# Patient Record
(demographics unavailable — no encounter records)

---

## 2025-03-03 NOTE — PHYSICAL EXAM
[de-identified] :                    Well appearing and nourished with no obvious deformities or distress.  Eyes:  No conjunctival injection and no xanthelasmas. HEENT:  Normocephalic.Normal oral mucosa. No pallor or cyanosis Neck:  No jugular venous distension. with normal A and V wave forms. No palpable adenopathy. Cardiovascular:  Normal rate and rhythm with normal S1, S2 and a grade 1/6 systolic murmur. Distal arterial pulses are normal. No significant peripheral edema. Pulmonary:  Lungs are clear to auscultation and percussion. Normal respiratory pattern without any accessory muscle use Abdomen:  Soft, non-tender ; no palpable organomegaly or masses. Extremities: No digital clubbing, cyanosis or ischemic changes. Skin:  No skin lesions, rashes, ulcers or xanthomas. Psychiatric:  Alert and oriented to person, place and time. Appropriate mood and affect.

## 2025-03-03 NOTE — HISTORY OF PRESENT ILLNESS
[FreeTextEntry1] : Patient's activity level is very limited by bad knees back and left shoulder.  He anticipates that there will be need for bilateral knee and left shoulder replacement.  Because of the orthopedic limitations, very difficult to comment on his overall functional capacity which is very limited.  Smokes occasional cigars. Social EtOH No regular exercise  History of a heart murmur. Uncertain about his blood pressure control.  No lightheadedness syncope No palpitations PND orthopnea No edema or claudication.

## 2025-03-03 NOTE — REASON FOR VISIT
[FreeTextEntry1] : 71-year-old male presents here for cardiac evaluation with concerns about cardiac risk factors and an ECG abnormality.  1.  Hypertension greater than 20 years 2.  Diabetes greater than 20 years 3.  Hyperlipidemia 4.  Greater than 30 pack years of cigarette smoking (stopped 16 years ago) Family history appears uncertain.

## 2025-03-03 NOTE — ASSESSMENT
[FreeTextEntry1] : ECG: Normal sinus rhythm at 62 with voltage criteria for LVH left anterior fascicular block and nonspecific T wave flattening.  Impression: 71-year-old hypertensive hyperlipidemic diabetic former smoker presenting for evaluation. Functional capacity very difficult to assess being very limited by a variety of orthopedic issues. Blood pressure today is on the high side Abnormal ECG as outlined above.  Plan: 1.  Serial blood pressure monitoring to assess the true level. 2.  Obtain and review copy of most recent blood work. 3.  Echocardiography to assess for hypertensive heart disease and the ECG abnormalities 4.  Pharmacologic nuclear stress test the patient cannot walk a treadmill. 5 we will regroup and discuss the results of the testing and data upon completion.

## 2025-04-28 NOTE — PHYSICAL EXAM
[de-identified] : Patient is awake and alert.  Well appearing in no acute distress Patient is interactive and appropriate RUE 5/5 Deltoid/Biceps/Triceps/WE/WF//IO LUE 5/5 Deltoid/Biceps/Triceps/WE/WF//IO RLE 5/5 HF/KE/KF/DF/PF/EHL LLE 5/5 HF/KE/KF/DF/PF/EHL Sensation intact to light touch No tenderness of the cervical, thoracic or lumbar midline or paraspinal region  Negative Willson's bilaterally Negative clonus bilaterally Palpable DP pulses bilaterally  Narrow-based gait, although outward bowing at bilateral knees, no assistive devices. reflexes 2+ [de-identified] : MRI Lumbar Spine Non Con 1/13/2025 ([facility]): IMPRESSION: 1. Moderate to advanced multilevel degenerative change. 2. Grade 1 anterolisthesis L4-L5. 3. Moderate to severe spinal canal stenosis L2-L3. Moderate spinal canal stenosis L1-L2. Mild to moderate spinal canal stenosis L3-L4 and L4-L5 4. Significant multilevel neural foraminal and subarticular recess stenosis outlined above.    XRay Lumbar Spine Flex/Ex 1/13/2025 ([facility]): IMPRESSION: Grade 1 degenerative anterolisthesis of L4 on L5. Progressive multilevel degenerative changes compared with prior study dated 01/19/2017

## 2025-04-28 NOTE — HISTORY OF PRESENT ILLNESS
[de-identified] : Mr. Juan A Hancock is a very pleasant 70 y/o male with a PMHx of DM, HTN, HLD, GERD, anxiety, hx of scoliosis s/p cervical to thoracic fusion in 2012 c/b post-operative infection (performed by an outside provider), chronic lower back pain x 10+ years, presents today for a neurosurgical consultation as a referral from pain management, Dr. Ng for ongoing back pain and neurogenic claudication. Mr. Hancock underwent a cervical to thoracic fusion in 2012 due to back pain from scoliosis, his postoperative course was complicated by Staphylococcus infection. His pain overall improved post-operatively, however he had begun to experience lower back pain a couple of years after the spinal fusion surgery. He reports bilateral lower back pain/stiffness that is worse first thing in the morning and improves throughout the day. Unfortunately, as he ambulates throughout the day his legs will become increasingly fatigued and heavy, causing him to stop what he is doing and rest before beginning activity again. He finds he needs to lean forward to release pressure in his lower back. He has been following with pain management provider, Dr. Ng for many years for his lower back pain and has undergone a multitude of injections including RFA and ALEXYS from L1-L5 which has provided some improvement (there are no records to review). He states that the injections have historically improved his symptoms for 8-10 weeks at a time, and then will largely return. The fatigue and heaviness of his legs are refractory to injections. He feels as though the injections are lasting for shorter periods of time, and Dr. Ng recommended a potential surgical intervention for treatment. He reports a 6+ year history of subjective right foot drop. He last went to physical therapy a year ago without significant benefit in his symptoms. He takes Tylenol PRN for pain with mild improvement; he has been trialed on multiple other medications w/o lasting effects.  He denies any bowel/bladder incontinence or saddle anesthesia.  Of note, he has a history of bilateral knee operations and is pending further workup for both knees. He feels as though he can be imbalanced due to knee pain. He is also pending a left shoulder surgery.   Mr. Hancock states that his most bothersome symptom at this time if right sided neck pain/stiffness which began 3 weeks ago, without any particular inciting event. He denies any shooting pain down his upper extremities or numbness/tingling of certain digits (except the digits with trigger finger which are bothersome). He denies any gabino clumsiness or issues with fine motor skills but mentions he can drop something here and there. He denies dropping of coffee cup or water.   His lower back pain, neck pain and diffuse joint pain (shoulders, knees) will fluctuate in severity depending on the activities that he is involved in. He used to practice martial arts.

## 2025-04-28 NOTE — ASSESSMENT
[FreeTextEntry1] : Mr. Juan A Hancock is a very pleasant 72 y/o male with a PMHx of DM, HTN, HLD, GERD, anxiety, hx of scoliosis s/p cervical to thoracic fusion in 2012 c/b post-operative infection (performed by an outside provider), chronic lower back pain x 10+ years, presents today for a neurosurgical consultation as a referral from pain management, Dr. Ng for ongoing back pain and neurogenic claudication. Mr. Hancock reports that his most bothersome symptom today is his right-sided neck pain/stiffness without any gabino cervical radicular symptoms.  He also reports ongoing bilateral lower back pain with potential neurogenic claudication, without any shooting pain, numbness, tingling down his lower extremities which has been treated the past few years with epidural steroid injections and radiofrequency ablation, however these methods of treatment have become less efficacious over time.  On exam he is neurologically intact with 5 out of 5 strength in bilateral upper and lower extremities, without any evidence of hyperreflexia, no notable tenderness of the cervical thoracic or lumbar spine.  His recent MRI lumbar spine without contrast obtained on January 13, 2025 revealed multilevel degenerative changes most significant at L2-L3 with severe central canal stenosis and moderate bilateral neuroforaminal stenosis, as well as L3-4 moderate/severe central canal stenosis and left greater than right neuroforaminal stenosis causing clumping of the nerves at these levels, as well as grade 1 L4 and L5 anterolisthesis.  X-ray lumbar spine upon flexion and extension on January 13, 2025 did not reveal any evidence of dynamic instability.  We discussed that his primary symptom of neck pain and lower back pain is multifactorial in nature with a musculoskeletal and muscle spasm component.  He appears to be exhibiting signs of neurogenic claudication, causing his difficulty with walking longer than a few hundred feet due to leg heaviness and fatigue, which could be caused by the significant central canal and neuroforaminal stenosis at L2 to L4.  We also discussed that his bilateral knee pain could also be contributing to his difficulty with walking, as he is pending a left knee operation.  Nonetheless, I encouraged he begin physical therapy for his neck and lower back pain as well as neurogenic claudication as he has not trialed this method of conservative management within the last year.  I have also requested records from his pain management provider, Dr. Ng to review what injections/medications were most successful.  I have requested records from his primary care provider, particularly an ZHOU of his lower extremities to rule out any potential arterial/venous component contributing to his symptoms. I will obtain a CT scan of the cervical, thoracic and lumbar spine without contrast to evaluate for any evidence of hardware abnormality or failure of his previous cervical/thoracic spinal construct, any evidence of fracture of the lumbar spine as we may plan for potential surgical intervention.  He will follow-up in approximately 3-4 weeks to review the imaging and discuss his overall progress with Dr. Coyne.  All questions were answered, he knows to call the office with any questions or concerns.   Plan: - CT Cervical, Thoracic and Lumbar Spine Non Con to evaluate for any evidence of hardware abnormality/failure. Any spinal abnormality, pending potential surgical planning.  - Referral to physical therapy for neck/back pain, neurogenic claudication  - Obtain records from pain management (Dr. Ng) and PCP (Dr. Cline)  - Follow up in 3-4 weeks to review imaging and discuss overall progress with Dr. Coyne. Potential surgical discussion for neurogenic claudication.

## 2025-05-29 NOTE — PHYSICAL EXAM
[de-identified] : Patient is awake and alert. Well appearing in no acute distress Patient is interactive and appropriate RLE 5/5 HF/KE/KF/DF/PF/EHL LLE 5/5 HF/KE/KF/DF/PF/EHL Sensation intact to light touch NEGATIVE straight leg raise bilaterally  Negative clonus bilaterally Narrow-based gait within normal limits reflexes 2+ [de-identified] : CT Cervical/Thoracic Spine Non Con, 5/01/2025, [facility]: "IMPRESSION:  Status post posterior fusion extending from C3 to T4 and corpectomy at C5 there is loosening of right T4 screw and minimal loosening of right T2 screw. The left T3 screw extends through costovertebral joint and lateral to the vertebral body. Inferior medial aspect of the pedicle screw on the right at T1 extends slightly inferior medial to the pedicle. There is solid osseous fusion at all of the surgical levels without evidence of pseudoarthrosis.   Multilevel degenerative change detailed above. Spinal canal stenosis with compression of the spinal cord at C2-C3. Moderate spinal canal stenosis at T5-T6. Multilevel neural foraminal stenosis."   ------------------------------------------------------------------------------------------------------------------------------------------------------------------------------------------- CT Lumbar Spine Non Con, [facility], 5/01/2025: "IMPRESSION: Advanced degenerative changes with multilevel spinal canal, subarticular recess, and neural foraminal stenosis outlined above.  Accounting for differences in modality findings not appear significantly changed compared with previous MRI from January 13, 2025."   ------------------------------------------------------------------------------------------------------------------------------------------------------------------------------------------- MRI Lumbar Spine Non Con 1/13/2025 ([facility]): IMPRESSION: 1. Moderate to advanced multilevel degenerative change. 2. Grade 1 anterolisthesis L4-L5. 3. Moderate to severe spinal canal stenosis L2-L3. Moderate spinal canal stenosis L1-L2. Mild to moderate spinal canal stenosis L3-L4 and L4-L5 4. Significant multilevel neural foraminal and subarticular recess stenosis outlined above.    ------------------------------------------------------------------------------------------------------------------------------------------------------------------------------------------- XRay Lumbar Spine Flex/Ex 1/13/2025 ([facility]): IMPRESSION: Grade 1 degenerative anterolisthesis of L4 on L5. Progressive multilevel degenerative changes compared with prior study dated 01/19/2017.

## 2025-05-29 NOTE — ASSESSMENT
[FreeTextEntry1] : Mr. Juan A Hancock is a very pleasant 70 y/o male with a PMHx of DM, HTN, HLD, GERD, anxiety, hx of scoliosis s/p C3-T4 posterior fusion with C5 corpectomy in 2012 c/b post-operative infection (performed by an outside provider), chronic lower back pain x 10+ years, presents today for a follow up visit for ongoing back pain and potential neurogenic claudication. Since his last visit with us on 4/28/2025 he underwent CT Cervical/Thoracic/Lumbar Spine at [facility] and continued at home exercises. Overall, he reports stability of his neurogenic claudication, worsened with prolonged standing/walking, improved with rest, and without any gabino radicular symptoms. He has obtained a CT Cervical, Thoracic and Lumbar Spine at [facility] which revealed overall successful fusion of previous cervical-thoracic fusion, with evidence of degenerative changes of the lumbar spine, worse at L2-3, with L4-5 grade 1 anterolisthesis without evidence of spondylolysis. XRay Lumbar Spine Flex/Ex did not reveal any abnormal motion at the L4-5 anterolisthesis. MRI Lumbar Spine Non Con again confirmed grade 1 anterolisthesis L4-5, with moderate/severe spinal canal stenosis at L2-3, and mild/moderate central canal stenosis from L3-L5. Given that his symptoms have improved after ESIs of the lumbar spine, it appears as though he is experiencing neurogenic claudication from his moderate/severe central canal stenosis. We discussed that he would benefit from a surgical intervention which entails a L2-S1 decompression for treatment, as his symptoms have remained refractory to conservative treatment methods including at home exercises and pain management injections.   I had an extensive discussion of risks, benefits, and alternatives of surgery. Alternatives include no surgery, physical therapy, and pain management. Benefits of surgery include decompression of spinal cord and nerve roots and possible stabilization of the spine with possible correction of deformity. Risks were thoroughly discussed and include but are not limited to bleeding, infection, pain, motor/sensory deficits, difficulty swallowing, hoarse speech, cerebrospinal fluid leak, spinal stroke, paralysis, death, risks of anesthesia, and possible need for repeat or subsequent surgery. Adverse effects and complications can be temporary or permanent. No guarantees of success were stated or implied. Patient verbalized understanding of the risks and benefits and agrees to proceed with surgery.  We recommended he consider a spinal decompression for treatment of his neurogenic claudication as it significantly impacts his quality of life and ability to ambulate/participate in ADLs. Of note, he has a remote ZHOU performed in February 2024 (results scanned into chart) which revealed non-compressibility of his lower extremity arteries, indicating potential moderate PAD. His ZHOU was elevated indicating potential extensive calcification of his lower extremity vasculature, which could also contribute to his claudication. We have placed a referral to vascular surgery for further evaluation, repeat ZHOU, potential lower extremity arterial duplexes to evaluate for PAD and its contribution to his claudication symptoms. He will follow up in 4-6 weeks to discuss his overall progress after obtaining EMG with Dr. Ng, and following up with vascular surgery, sooner if any new or worsening symptoms develop.   Plan: - Referral to Vascular surgery for ZHOU, r/o PAD/vascular claudication, hx of ZHOU showing non-compressible arteries - Discussed potential surgical intervention for treatment of neurogenic claudication which would entail a L2-S1 decompression  - Follow up in 4-6 weeks to review overall progress, discuss decision to undergo surgery

## 2025-05-29 NOTE — HISTORY OF PRESENT ILLNESS
[de-identified] : Mr. Juan A Hancock is a very pleasant 72 y/o male with a PMHx of DM, HTN, HLD, GERD, anxiety, hx of scoliosis s/p C3-T4 posterior fusion with C5 corpectomy in 2012 c/b post-operative infection (performed by an outside provider), chronic lower back pain x 10+ years, presents today for a follow up visit for ongoing back pain and potential neurogenic claudication. Since his last visit with us on 4/28/2025 he underwent CT Cervical/Thoracic/Lumbar Spine at [facility] and continued at home exercises. Overall, he reports stability of fatiguability of bilateral lower extremities, worse when walking down stairs, and longer distances. His most bothersome symptom is difficulty with walking, going down the stairs & it is worse to travel down the stairs. He states that if he needed to walk, he could walk for about 1-2 blocks, but his legs will feel tired and fatigued & need to rest to improve symptoms. When he leans forward on the shopping cart it improves the pain. He has been doing at home exercises/light aerobic activity with mild relief of his symptoms. Denies shooting pain down his lower extremities, the weakness travels down his legs.  Denies bowel/bladder incontinence or saddle anesthesia.  Pending EMG of bilateral lower extremities w/ Dr. Ng.   Of note, he most recently underwent a right sided L2-3 and L3-4 TFESI on 1/23/2025 with reportedly 80% improvement of neurogenic claudication.

## 2025-05-29 NOTE — REASON FOR VISIT
[Follow-Up Visit] : a follow-up visit for [Back Pain] : back pain [FreeTextEntry2] : Neurogenic claudication

## 2025-06-20 NOTE — REASON FOR VISIT
[FreeTextEntry1] : 71-year-old male presents here for cardiac evaluation regarding his cardiac risk factors and an abnormal ECG following an echocardiogram and a pharmacologic nuclear stress test.  History includes: 1.  Hypertension greater than 20 years 2.  Diabetes greater than 20 years 3.  Hyperlipidemia 4.  Greater than 30 pack years of cigarette smoking (stopped 16 years ago) Family history appears uncertain.

## 2025-06-20 NOTE — HISTORY OF PRESENT ILLNESS
[FreeTextEntry1] :  Patient had, by description, a left eye vitrectomy 1 week ago from which she is convalescing.  Patient's activity level is very limited by bad knees back and left shoulder.  He anticipates that there will be need for bilateral knee and left shoulder replacement.  Because of the orthopedic limitations, very difficult to comment on his overall functional capacity which is very limited.  Smokes occasional cigars. Social EtOH No regular exercise  History of a heart murmur. Uncertain about his blood pressure control.  No lightheadedness syncope No palpitations PND orthopnea No edema or claudication.

## 2025-06-20 NOTE — ASSESSMENT
[FreeTextEntry1] : ECG: Normal sinus rhythm at 62 with voltage criteria for LVH left anterior fascicular block and nonspecific T wave flattening.  Pharmacologic sestamibi 4/18/2025 No inducible ischemia no evidence of infarction.  Echocardiogram 5/14/2025 Normal LV function LVEF 60-65 send with no significant valvular disease.   Impression: 71-year-old hypertensive hyperlipidemic diabetic former smoker presenting for evaluation. Functional capacity very difficult to assess being very limited by a variety of orthopedic issues. Blood pressure today is on the high side Abnormal ECG as outlined above. No evidence of inducible ischemia. Overall LV function remains normal.  Plan: 1.  Serial blood pressure monitoring to assess the true level. 2.  Obtain and review copy of most recent blood work.  Still pending. 3.  No indication for any other cardiac testing at this time  Still need to review the results of serial blood pressure monitoring and a lipid panel.

## 2025-06-20 NOTE — PHYSICAL EXAM
[de-identified] :                    Well appearing and nourished with no obvious deformities or distress.  Eyes:  No conjunctival injection and no xanthelasmas. HEENT:  Normocephalic.Normal oral mucosa. No pallor or cyanosis Neck:  No jugular venous distension. with normal A and V wave forms. No palpable adenopathy. Cardiovascular:  Normal rate and rhythm with normal S1, S2 and a grade 1/6 systolic murmur. Distal arterial pulses are normal. No significant peripheral edema. Pulmonary:  Lungs are clear to auscultation and percussion. Normal respiratory pattern without any accessory muscle use Abdomen:  Soft, non-tender ; no palpable organomegaly or masses. Extremities: No digital clubbing, cyanosis or ischemic changes. Skin:  No skin lesions, rashes, ulcers or xanthomas. Psychiatric:  Alert and oriented to person, place and time. Appropriate mood and affect.